# Patient Record
Sex: FEMALE | Race: WHITE | NOT HISPANIC OR LATINO | URBAN - METROPOLITAN AREA
[De-identification: names, ages, dates, MRNs, and addresses within clinical notes are randomized per-mention and may not be internally consistent; named-entity substitution may affect disease eponyms.]

---

## 2024-05-17 ENCOUNTER — EMERGENCY (EMERGENCY)
Facility: HOSPITAL | Age: 24
LOS: 1 days | Discharge: HOME | End: 2024-05-17
Attending: STUDENT IN AN ORGANIZED HEALTH CARE EDUCATION/TRAINING PROGRAM | Admitting: STUDENT IN AN ORGANIZED HEALTH CARE EDUCATION/TRAINING PROGRAM
Payer: COMMERCIAL

## 2024-05-17 VITALS
HEART RATE: 85 BPM | TEMPERATURE: 98 F | HEIGHT: 65 IN | WEIGHT: 169.98 LBS | OXYGEN SATURATION: 99 % | SYSTOLIC BLOOD PRESSURE: 109 MMHG | RESPIRATION RATE: 18 BRPM | DIASTOLIC BLOOD PRESSURE: 64 MMHG

## 2024-05-17 LAB — HCG UR QL: NEGATIVE — SIGNIFICANT CHANGE UP

## 2024-05-17 PROCEDURE — 99284 EMERGENCY DEPT VISIT MOD MDM: CPT | Mod: 25

## 2024-05-17 PROCEDURE — 81025 URINE PREGNANCY TEST: CPT

## 2024-05-17 PROCEDURE — 93971 EXTREMITY STUDY: CPT | Mod: 26,RT

## 2024-05-17 PROCEDURE — 99284 EMERGENCY DEPT VISIT MOD MDM: CPT

## 2024-05-17 PROCEDURE — 93971 EXTREMITY STUDY: CPT

## 2024-05-17 RX ORDER — IBUPROFEN 200 MG
600 TABLET ORAL ONCE
Refills: 0 | Status: COMPLETED | OUTPATIENT
Start: 2024-05-17 | End: 2024-05-17

## 2024-05-17 RX ADMIN — Medication 600 MILLIGRAM(S): at 22:42

## 2024-05-17 NOTE — ED ADULT NURSE NOTE - NSFALLUNIVINTERV_ED_ALL_ED
Bed/Stretcher in lowest position, wheels locked, appropriate side rails in place/Call bell, personal items and telephone in reach/Instruct patient to call for assistance before getting out of bed/chair/stretcher/Non-slip footwear applied when patient is off stretcher/[place] to call system/Physically safe environment - no spills, clutter or unnecessary equipment/Purposeful proactive rounding/Room/bathroom lighting operational, light cord in reach

## 2024-05-17 NOTE — ED PROVIDER NOTE - PHYSICAL EXAMINATION
Constitutional: Awake, Alert, non-toxic. NAD. Well appearing, well nourished.   HEAD: Normocephalic, atraumatic.   EYES: EOM intact, conjunctiva and sclera are clear bilaterally.   ENT: No rhinorrhea, patent, mucous membranes pink/moist, no drooling or stridor.   NECK: Supple, non-tender  RESPIRATORY: Normal respiratory effort  EXTREMITIES: Full passive and active ROM in all extremities; (+) right forearm swelling, (+) medial elbow TTP, no erythema, no increased warmth, soft compartments, cap refill intact, radial pulse intact, wrist and shoulder non-tender to palpation; distal pulses palpable and symmetric  SKIN: Warm, dry; good skin turgor, no apparent lesions or rashes, no ecchymosis, brisk capillary refill.  NEURO: A&O x3. Sensory and motor functions are grossly intact. Speech is normal. Appearance and judgement seem appropriate for gender and age.

## 2024-05-17 NOTE — ED PROVIDER NOTE - CLINICAL SUMMARY MEDICAL DECISION MAKING FREE TEXT BOX
I, Royer Guadarrama MD, have seen and examined the patient on the date of service.  I agree with the JOSE's assessment as written, with exceptions or additions as noted below or in a separate note.  Patient with no significant past medical history is presenting with right arm swelling.  States that she noticed this yesterday after working on her arms.  Was seen at urgent care and sent to ED for evaluation.  Patient otherwise denies any new trauma other than recent working out.  On exam there is mild swelling to the right proximal forearm.  She has full range of motion of right elbow with no overlying erythema or warmth.  She has intact radial pulse.  Concern for possible bursitis versus dependent edema.  Do not suspect DVT though will assess with ultrasound.  History and exam is not consistent with compartment syndrome.  She is neurovascular intact.  Given that her ultrasound is negative and patient feels well, will plan for discharge.  Patient and family agreeable with plan.

## 2024-05-17 NOTE — ED PROVIDER NOTE - PATIENT PORTAL LINK FT
You can access the FollowMyHealth Patient Portal offered by [facility] by registering at the following website: [email or web address]. By joining [facility] FollowMyHealth portal, you will also be able to view your health information using other applications (apps) compatible with our system.

## 2024-05-17 NOTE — ED PROVIDER NOTE - NSFOLLOWUPINSTRUCTIONS_ED_ALL_ED_FT
Bursitis  Front, or anterior, view of the knee with a close-up of an inflamed bursa.  Bursitis is inflammation and irritation of a bursa, which is a small fluid-filled sac that cushions and protects the joints. These sacs are located between bones and muscles, bones and muscle attachments, or bones and skin areas that are next to bones. A bursa protects those structures from the wear and tear that results from frequent movement. If the bursa becomes irritated, it can fill with extra fluid. Bursitis is most common near joints, especially the knees, elbows, hips, and shoulders.    What are the causes?  This condition may be caused by:  An injury like a direct hit to a joint area, such as falling on your knee or elbow.  Overuse of a joint (repetitive stress).  Infection. This can happen if bacteria get into a bursa through a cut or scrape near a joint.  Diseases that cause joint inflammation, such as gout and rheumatoid arthritis.  What increases the risk?  You are more likely to develop this condition if:  You have a job or hobby that involves a lot of repetitive stress on your joints.  You have a condition that weakens your body's defense system (immune system), such as diabetes, cancer, or HIV.  You do any of these often:  Lift and reach overhead.  Kneel or lean on hard surfaces.  Participate in physical activities that include repetitive motion, like running or walking.  What are the signs or symptoms?  Common symptoms of this condition include:  Pain that gets worse when you move the affected body part or use it to support your body weight.  Inflammation.  Stiffness.  Other symptoms include:  Redness.  Swelling.  Tenderness.  Warmth.  Pain that continues after rest.  Fever or chills. These may occur in bursitis that is caused by infection.  How is this diagnosed?  This condition may be diagnosed based on:  Your medical history and a physical exam.  Imaging tests, such as an MRI or X-ray.  Draining fluid from the bursa to test it for infection or gout.  Blood tests to rule out other causes of inflammation.  How is this treated?  This condition can usually be treated at home with rest, ice, applying pressure (compression), and raising the body part that is affected (elevation). This is called RICE therapy. For mild bursitis, RICE therapy may be all you need. Other treatments may include:  Over-the-counter medicines to relieve pain and inflammation.  Injections of anti-inflammatory medicines. These medicines may be injected into and around the area of bursitis.  Draining of fluid from the bursa to relieve pain and improve movement.  Antibiotic medicine if there is an infection.  Using a splint, brace, elastic wrap, pads, or walking aid, such as a cane.  Physical or occupational therapy if you continue to have pain or limited movement. Your physical or occupational therapist can help determine what may have caused or contributed to the bursitis. This will help avoid further episodes.  Surgery to remove a damaged or infected bursa. This may be needed if other treatments have not worked.  Follow these instructions at home:  Medicines    Take over-the-counter and prescription medicines only as told by your health care provider.  If you were prescribed an antibiotic medicine, take it as told by your health care provider. Do not stop using the antibiotic even if you start to feel better.  Managing pain, stiffness, and swelling    Bag of ice on a towel on the skin.  A heating pad being used on the affected joint.  Raise (elevate) the injured area above the level of your heart while you are sitting or lying down.  If directed, put ice on the affected area. To do this:  Put ice in a plastic bag.  Place a towel between your skin and the bag, or between your splint or brace and the bag.  Leave the ice on for 20 minutes, 2–3 times a day.  Remove the ice if your skin turns bright red. This is very important. If you cannot feel pain, heat, or cold, you have a greater risk of damage to the area.  If directed, apply heat to the affected area as often as told by your health care provider. Use the heat source that your health care provider recommends, such as a moist heat pack or a heating pad.  Place a towel between your skin and the heat source.  Leave the heat on for 20–30 minutes.  Remove the heat if your skin turns bright red. This is especially important if you are unable to feel pain, heat, or cold. You may have a greater risk of getting burned.  General instructions    Rest the affected area as told by your health care provider.  Avoid activities that make pain worse.  Use splints, braces, pads, elastic wrap, or walking aids as told by your health care provider.  Keep all follow-up visits. This is important.  Preventing future episodes    Wear knee pads if you kneel often.  Wear sturdy running or walking shoes that fit well.  Take breaks regularly from repetitive activity.  Warm up by stretching before doing any activity that takes a lot of effort.  Maintain a healthy weight or lose weight as recommended by your health care provider. If you need help doing this, ask your health care provider.  Exercise regularly. Start any new physical activity gradually.  Work with your physical or occupational therapist and your health care provider to help determine what caused the bursitis.  Contact a health care provider if:  You have a fever or chills.  Your symptoms do not get better with treatment.  You have pain or swelling that gets worse, or it goes away and then comes back.  You have pus draining from the affected area.  You have redness around the affected area.  The affected area is warm to the touch.  Summary  Bursitis is inflammation and irritation of a bursa, which is a small fluid-filled sac that cushions and protects the joints.  Rest the affected area as told by your health care provider.  Avoid activities that make pain worse.  This condition can usually be treated at home with rest, ice, applying pressure (compression), and raising the body part that is affected (elevation). This is called RICE therapy.  This information is not intended to replace advice given to you by your health care provider. Make sure you discuss any questions you have with your health care provider.

## 2024-05-17 NOTE — ED PROVIDER NOTE - OBJECTIVE STATEMENT
24 y/o female without reported PMHx presents today due to right arm swelling. pt worked out arms yesterday then had gradual increased swelling to right elbow and forearm today. pt describes the pain as aching, non-radiating, and currently 5/10. pt denies fever, direct trauma, numbness/weakness, redness, vomiting, or any other complaints. 24 y/o female without reported PMHx presents today due to right arm swelling. pt worked out arms yesterday then had gradual increased swelling to right elbow and forearm today. pt describes the pain as aching, non-radiating, and currently 5/10. Pt was sent by urgent care to rule out compartment syndrome and blood clot.  pt had an x-ray of forearm and elbow at urgent care which was negative. pt denies fever, direct trauma, numbness/weakness, redness, vomiting, or any other complaints.

## 2024-05-17 NOTE — ED ADULT NURSE NOTE - NSFALLLASTSIX_ED_ALL_ED
Plastic Surgeon Procedure Text (A): After obtaining clear surgical margins the patient was sent to plastics for surgical repair.  The patient understands they will receive post-surgical care and follow-up from the referring physician's office. No.

## 2024-05-17 NOTE — ED ADULT NURSE NOTE - OBJECTIVE STATEMENT
Pt received in 16A 23y female AXO 4 from home is ambulatory c/o Right FA swelling. No PMH. pt states she went to the gym yesterday and states she "worked out my arms" and woke up today with Right FA swelling. Pt [facility] and [facility] to [place] [facility] to be evaluated. Upon assessment pt's right FA is swollen, non tender upon palpation and pts pulse motor and sensory are intact on affected arm. pending MD.